# Patient Record
Sex: FEMALE
[De-identification: names, ages, dates, MRNs, and addresses within clinical notes are randomized per-mention and may not be internally consistent; named-entity substitution may affect disease eponyms.]

---

## 2021-10-24 ENCOUNTER — TRANSCRIPTION ENCOUNTER (OUTPATIENT)
Age: 36
End: 2021-10-24

## 2023-08-03 ENCOUNTER — APPOINTMENT (OUTPATIENT)
Dept: ORTHOPEDIC SURGERY | Facility: CLINIC | Age: 38
End: 2023-08-03
Payer: COMMERCIAL

## 2023-08-03 VITALS — HEIGHT: 62 IN | WEIGHT: 135 LBS | BODY MASS INDEX: 24.84 KG/M2

## 2023-08-03 DIAGNOSIS — M65.9 SYNOVITIS AND TENOSYNOVITIS, UNSPECIFIED: ICD-10-CM

## 2023-08-03 PROBLEM — Z00.00 ENCOUNTER FOR PREVENTIVE HEALTH EXAMINATION: Status: ACTIVE | Noted: 2023-08-03

## 2023-08-03 PROCEDURE — 73130 X-RAY EXAM OF HAND: CPT | Mod: RT

## 2023-08-03 PROCEDURE — 99204 OFFICE O/P NEW MOD 45 MIN: CPT

## 2023-08-03 RX ORDER — NORETHINDRONE ACETATE AND ETHINYL ESTRADIOL, ETHINYL ESTRADIOL AND FERROUS FUMARATE 1MG-10(24)
KIT ORAL
Refills: 0 | Status: ACTIVE | COMMUNITY

## 2023-08-03 RX ORDER — DICLOFENAC SODIUM 1% 10 MG/G
1 GEL TOPICAL DAILY
Qty: 1 | Refills: 3 | Status: ACTIVE | COMMUNITY
Start: 2023-08-03 | End: 1900-01-01

## 2023-08-03 NOTE — HISTORY OF PRESENT ILLNESS
[5] : 5 [Tingling] : tingling [Occasional] : occasional [Leisure] : leisure [Full time] : Work status: full time [de-identified] : 8/3/2023: rhd 36 yo female here with complaint of right-hand pain x 2 years. Pt denies hx of trauma. Pain is localized to the thumb region and worse with lifting her children.   PMH: denied Allergies: NKDA  [] : This patient has had an injection before: no [FreeTextEntry1] : rt hand [FreeTextEntry3] : 2 years ago [FreeTextEntry5] : Patient is here for ongoing rt hand pain onset 2 years ago. Pt complains about tingling and dull pain after caring for her kids on 7/29/23. [FreeTextEntry6] : swelling [de-identified] : lifting [de-identified] : none

## 2023-08-03 NOTE — ASSESSMENT
[FreeTextEntry1] : The patient was advised of the diagnosis. The natural history of the pathology was explained in full to the patient in layman's terms. All questions were answered. The risks and benefits of surgical and non-surgical treatment alternatives were explained in full to the patient.   Pt provided rx for right thumb push metagrip brace. rx for voltaren gel qid provided. RTO prn.

## 2023-08-03 NOTE — IMAGING
[Right] : right hand [There are no fractures, subluxations or dislocations. No significant abnormalities are seen] : There are no fractures, subluxations or dislocations. No significant abnormalities are seen [de-identified] : right hand with no skin changes/bony deformity. TTP over the thenar region and 1st cmc joint. +Basal joint Grind Test noted. Avelar, TFCC and Finkelstein Tests are negative. RUE strength is 5/5. All digits are nvi with FAROM.